# Patient Record
Sex: MALE | URBAN - METROPOLITAN AREA
[De-identification: names, ages, dates, MRNs, and addresses within clinical notes are randomized per-mention and may not be internally consistent; named-entity substitution may affect disease eponyms.]

---

## 2018-10-30 ENCOUNTER — TELEPHONE (OUTPATIENT)
Dept: SPORTS MEDICINE | Facility: CLINIC | Age: 38
End: 2018-10-30

## 2024-01-05 ENCOUNTER — OFFICE VISIT (OUTPATIENT)
Dept: SURGERY | Facility: CLINIC | Age: 44
End: 2024-01-05
Payer: COMMERCIAL

## 2024-01-05 VITALS
HEART RATE: 95 BPM | SYSTOLIC BLOOD PRESSURE: 122 MMHG | OXYGEN SATURATION: 100 % | DIASTOLIC BLOOD PRESSURE: 76 MMHG | WEIGHT: 262.4 LBS | HEIGHT: 73 IN | BODY MASS INDEX: 34.78 KG/M2

## 2024-01-05 DIAGNOSIS — Z86.010 PERSONAL HISTORY OF COLONIC POLYPS: Primary | ICD-10-CM

## 2024-01-05 DIAGNOSIS — K64.4 EXTERNAL HEMORRHOIDS WITH COMPLICATION: ICD-10-CM

## 2024-01-05 DIAGNOSIS — K64.8 INTERNAL HEMORRHOIDS WITH COMPLICATION: ICD-10-CM

## 2024-01-05 PROCEDURE — 46600 DIAGNOSTIC ANOSCOPY SPX: CPT | Performed by: PHYSICIAN ASSISTANT

## 2024-01-05 PROCEDURE — 99204 OFFICE O/P NEW MOD 45 MIN: CPT | Performed by: PHYSICIAN ASSISTANT

## 2024-01-05 RX ORDER — SPIRONOLACTONE 25 MG/1
25 TABLET ORAL DAILY
COMMUNITY

## 2024-01-05 RX ORDER — DAPAGLIFLOZIN AND METFORMIN HYDROCHLORIDE 10; 1000 MG/1; MG/1
1 TABLET, FILM COATED, EXTENDED RELEASE ORAL
COMMUNITY
Start: 2023-12-31

## 2024-01-05 RX ORDER — ASCORBIC ACID 250 MG
250 TABLET ORAL AS NEEDED
COMMUNITY

## 2024-01-05 RX ORDER — SODIUM CHLORIDE 9 MG/ML
30 INJECTION, SOLUTION INTRAVENOUS CONTINUOUS PRN
OUTPATIENT
Start: 2024-01-05

## 2024-01-05 RX ORDER — ROSUVASTATIN CALCIUM 20 MG/1
20 TABLET, COATED ORAL DAILY
COMMUNITY
Start: 2023-12-19

## 2024-01-05 RX ORDER — TADALAFIL 5 MG/1
5 TABLET ORAL AS NEEDED
COMMUNITY
Start: 2023-10-12

## 2024-01-05 RX ORDER — BLOOD-GLUCOSE SENSOR
EACH MISCELLANEOUS
COMMUNITY
Start: 2023-09-11

## 2024-01-05 RX ORDER — BUMETANIDE 2 MG/1
2 TABLET ORAL DAILY
COMMUNITY
Start: 2023-12-11

## 2024-01-05 RX ORDER — UBIDECARENONE 75 MG
100 CAPSULE ORAL DAILY
COMMUNITY

## 2024-01-05 RX ORDER — TESTOSTERONE 20.25 MG/1.25G
2 GEL TOPICAL DAILY
COMMUNITY
Start: 2023-11-13

## 2024-01-05 RX ORDER — SEMAGLUTIDE 2.68 MG/ML
2 INJECTION, SOLUTION SUBCUTANEOUS WEEKLY
COMMUNITY
Start: 2023-12-05

## 2024-01-05 RX ORDER — FLUCONAZOLE 100 MG/1
100 TABLET ORAL AS NEEDED
COMMUNITY
Start: 2014-10-07

## 2024-01-05 NOTE — PROGRESS NOTES
Bernardino Hoyos is a 43 y.o. male who is seen as a consult at the request of Guero Carter MD for hemorrhoids.      HPI:  Has had hemorrhoid for 20 years. Has intermittent flares with pain and bleeding. Bleeding occurs every few months, and pain occurs about once per year. Stable over the years rather than worsening. Thrombosed once in 2009 with incision and thrombectomy. Has BM twice daily. Sublette ranges from 1-7; 4 most often. Some straining with BMs if constipated. Takes stool softeners as needed. Does not take fiber regularly. If hemorrhoid symptoms are more severe, uses preparation h.     Positive family history of colon polyps in mother and brother. Had colonoscopy in about 2010 with Dr. Rich Boyd. Patient believes there was 1 polyp removed.    History reviewed. No pertinent past medical history.    Past Surgical History:   Procedure Laterality Date    ANKLE SURGERY Left 2010    CARDIAC DEFIBRILLATOR PLACEMENT  2016    HERNIA REPAIR  2020    KNEE SURGERY Left        Social History:   reports that he quit smoking about 7 years ago. His smoking use included cigarettes. He has a 7.50 pack-year smoking history. He has been exposed to tobacco smoke. He quit smokeless tobacco use about 7 years ago.  His smokeless tobacco use included snuff. He reports current alcohol use. He reports current drug use. Drug: Marijuana.      Marriage status:     History reviewed. No pertinent family history.      Current Outpatient Medications:     bumetanide (BUMEX) 2 MG tablet, Take 1 tablet by mouth Daily., Disp: , Rfl:     Continuous Blood Gluc Sensor (FreeStyle Snow 3 Sensor) misc, USE EVERY 14 DAYS AND CHECK SUGARS FREQUENTLY AS DIRECTED, Disp: , Rfl:     fluconazole (DIFLUCAN) 100 MG tablet, Take 1 tablet by mouth As Needed., Disp: , Rfl:     glucose blood test strip, Daily., Disp: , Rfl:     Ozempic, 2 MG/DOSE, 8 MG/3ML solution pen-injector, Inject 2 mg under the skin into the appropriate area as directed 1 (One)  Time Per Week., Disp: , Rfl:     rosuvastatin (CRESTOR) 20 MG tablet, Take 1 tablet by mouth Daily., Disp: , Rfl:     tadalafil (CIALIS) 5 MG tablet, Take 1 tablet by mouth As Needed., Disp: , Rfl:     Testosterone 1.62 % gel, Apply 2 g topically to the appropriate area as directed Daily., Disp: , Rfl:     Xigduo XR  MG tablet, 1 tablet., Disp: , Rfl:     ascorbic acid (VITAMIN C) 250 MG tablet, Take 1 tablet by mouth As Needed., Disp: , Rfl:     spironolactone (ALDACTONE) 25 MG tablet, Take 1 tablet by mouth Daily., Disp: , Rfl:     vitamin B-12 (cyanocobalamin) 100 MCG tablet, Take 1 tablet by mouth Daily., Disp: , Rfl:     Allergy  Ndxfveawk-fhclysho-re and Sulfa antibiotics    Vitals:    01/05/24 0958   BP: 122/76   Pulse: 95   SpO2: 100%   -  Body mass index is 34.62 kg/m².    Physical Exam  No acute distress  Chaperone present  Perianal exam: external hemorrhoids mildly enlarged. Small hypertrophied anal papilla in left posterior position. BILL: no masses. Anoscopy performed: Grade 2-3 x 3 irritated internal hemorrhoids    Review of Medical Record:  I reviewed records as noted in HPI.    Assessment:  1. Personal history of colonic polyps    2. External hemorrhoids with complication    3. Internal hemorrhoids with complication      Plan:  Schedule colonoscopy. Will obtain cardiac clearance due to patient's cardiac history and ejection fraction of 10%.  For the hemorrhoids, I advised the patient to start a fiber supplement to a daily total intake of 30 to 40 g for stool consistency.  I provided detailed instructions.    I prescribed Anusol cream for hemorrhoids and gave instructions for use, including that cream is not for daily long-term use.   Follow up after colonoscopy as needed. Call with any questions, concerns, or worsening symptoms.    Lore Márquez PA-C  Physician Assistant  Colorectal Surgery

## 2024-01-29 ENCOUNTER — TELEPHONE (OUTPATIENT)
Dept: SURGERY | Facility: CLINIC | Age: 44
End: 2024-01-29
Payer: COMMERCIAL

## 2024-04-17 ENCOUNTER — ANESTHESIA (OUTPATIENT)
Dept: GASTROENTEROLOGY | Facility: HOSPITAL | Age: 44
End: 2024-04-17
Payer: COMMERCIAL

## 2024-04-17 ENCOUNTER — HOSPITAL ENCOUNTER (OUTPATIENT)
Facility: HOSPITAL | Age: 44
Setting detail: HOSPITAL OUTPATIENT SURGERY
Discharge: HOME | End: 2024-04-17
Attending: COLON & RECTAL SURGERY | Admitting: COLON & RECTAL SURGERY
Payer: COMMERCIAL

## 2024-04-17 ENCOUNTER — ANESTHESIA EVENT (OUTPATIENT)
Dept: GASTROENTEROLOGY | Facility: HOSPITAL | Age: 44
End: 2024-04-17
Payer: COMMERCIAL

## 2024-04-17 VITALS
WEIGHT: 264 LBS | BODY MASS INDEX: 34.83 KG/M2 | RESPIRATION RATE: 16 BRPM | OXYGEN SATURATION: 100 % | DIASTOLIC BLOOD PRESSURE: 68 MMHG | SYSTOLIC BLOOD PRESSURE: 116 MMHG | HEART RATE: 72 BPM

## 2024-04-17 DIAGNOSIS — Z86.010 PERSONAL HISTORY OF COLONIC POLYPS: ICD-10-CM

## 2024-04-17 LAB — GLUCOSE BLDC GLUCOMTR-MCNC: 125 MG/DL (ref 70–130)

## 2024-04-17 PROCEDURE — 45378 DIAGNOSTIC COLONOSCOPY: CPT | Performed by: COLON & RECTAL SURGERY

## 2024-04-17 PROCEDURE — 25010000002 PROPOFOL 10 MG/ML EMULSION: Performed by: NURSE ANESTHETIST, CERTIFIED REGISTERED

## 2024-04-17 PROCEDURE — 82948 REAGENT STRIP/BLOOD GLUCOSE: CPT

## 2024-04-17 PROCEDURE — 25010000002 GLYCOPYRROLATE 0.2 MG/ML SOLUTION: Performed by: NURSE ANESTHETIST, CERTIFIED REGISTERED

## 2024-04-17 PROCEDURE — 25810000003 LACTATED RINGERS PER 1000 ML: Performed by: NURSE ANESTHETIST, CERTIFIED REGISTERED

## 2024-04-17 RX ORDER — SODIUM CHLORIDE, SODIUM LACTATE, POTASSIUM CHLORIDE, CALCIUM CHLORIDE 600; 310; 30; 20 MG/100ML; MG/100ML; MG/100ML; MG/100ML
30 INJECTION, SOLUTION INTRAVENOUS CONTINUOUS PRN
Status: DISCONTINUED | OUTPATIENT
Start: 2024-04-17 | End: 2024-04-17 | Stop reason: HOSPADM

## 2024-04-17 RX ORDER — DROPERIDOL 2.5 MG/ML
0.62 INJECTION, SOLUTION INTRAMUSCULAR; INTRAVENOUS
Status: DISCONTINUED | OUTPATIENT
Start: 2024-04-17 | End: 2024-04-17 | Stop reason: HOSPADM

## 2024-04-17 RX ORDER — PROPOFOL 10 MG/ML
VIAL (ML) INTRAVENOUS CONTINUOUS PRN
Status: DISCONTINUED | OUTPATIENT
Start: 2024-04-17 | End: 2024-04-17 | Stop reason: SURG

## 2024-04-17 RX ORDER — DIPHENHYDRAMINE HYDROCHLORIDE 50 MG/ML
12.5 INJECTION INTRAMUSCULAR; INTRAVENOUS
Status: DISCONTINUED | OUTPATIENT
Start: 2024-04-17 | End: 2024-04-17 | Stop reason: HOSPADM

## 2024-04-17 RX ORDER — ACETAMINOPHEN 500 MG
500 TABLET ORAL EVERY 6 HOURS PRN
COMMUNITY

## 2024-04-17 RX ORDER — SACUBITRIL AND VALSARTAN 49; 51 MG/1; MG/1
1 TABLET, FILM COATED ORAL 2 TIMES DAILY
COMMUNITY

## 2024-04-17 RX ORDER — SODIUM CHLORIDE 9 MG/ML
40 INJECTION, SOLUTION INTRAVENOUS AS NEEDED
Status: DISCONTINUED | OUTPATIENT
Start: 2024-04-17 | End: 2024-04-17 | Stop reason: HOSPADM

## 2024-04-17 RX ORDER — GLYCOPYRROLATE 0.2 MG/ML
INJECTION INTRAMUSCULAR; INTRAVENOUS AS NEEDED
Status: DISCONTINUED | OUTPATIENT
Start: 2024-04-17 | End: 2024-04-17 | Stop reason: SURG

## 2024-04-17 RX ORDER — SODIUM CHLORIDE 0.9 % (FLUSH) 0.9 %
10 SYRINGE (ML) INJECTION EVERY 12 HOURS SCHEDULED
Status: DISCONTINUED | OUTPATIENT
Start: 2024-04-17 | End: 2024-04-17 | Stop reason: HOSPADM

## 2024-04-17 RX ORDER — PROMETHAZINE HYDROCHLORIDE 25 MG/1
25 TABLET ORAL ONCE AS NEEDED
Status: DISCONTINUED | OUTPATIENT
Start: 2024-04-17 | End: 2024-04-17 | Stop reason: HOSPADM

## 2024-04-17 RX ORDER — LIDOCAINE HYDROCHLORIDE 20 MG/ML
INJECTION, SOLUTION INFILTRATION; PERINEURAL AS NEEDED
Status: DISCONTINUED | OUTPATIENT
Start: 2024-04-17 | End: 2024-04-17 | Stop reason: SURG

## 2024-04-17 RX ORDER — SODIUM CHLORIDE 0.9 % (FLUSH) 0.9 %
10 SYRINGE (ML) INJECTION AS NEEDED
Status: DISCONTINUED | OUTPATIENT
Start: 2024-04-17 | End: 2024-04-17 | Stop reason: HOSPADM

## 2024-04-17 RX ORDER — ONDANSETRON 2 MG/ML
4 INJECTION INTRAMUSCULAR; INTRAVENOUS ONCE AS NEEDED
Status: DISCONTINUED | OUTPATIENT
Start: 2024-04-17 | End: 2024-04-17 | Stop reason: HOSPADM

## 2024-04-17 RX ORDER — PROMETHAZINE HYDROCHLORIDE 25 MG/1
25 SUPPOSITORY RECTAL ONCE AS NEEDED
Status: DISCONTINUED | OUTPATIENT
Start: 2024-04-17 | End: 2024-04-17 | Stop reason: HOSPADM

## 2024-04-17 RX ORDER — SODIUM CHLORIDE, SODIUM LACTATE, POTASSIUM CHLORIDE, CALCIUM CHLORIDE 600; 310; 30; 20 MG/100ML; MG/100ML; MG/100ML; MG/100ML
INJECTION, SOLUTION INTRAVENOUS CONTINUOUS PRN
Status: DISCONTINUED | OUTPATIENT
Start: 2024-04-17 | End: 2024-04-17 | Stop reason: SURG

## 2024-04-17 RX ORDER — SODIUM CHLORIDE 9 MG/ML
30 INJECTION, SOLUTION INTRAVENOUS CONTINUOUS PRN
Status: DISCONTINUED | OUTPATIENT
Start: 2024-04-17 | End: 2024-04-17 | Stop reason: HOSPADM

## 2024-04-17 RX ORDER — DIPHENHYDRAMINE HCL 25 MG
50 CAPSULE ORAL NIGHTLY PRN
COMMUNITY

## 2024-04-17 RX ADMIN — SODIUM CHLORIDE, SODIUM LACTATE, POTASSIUM CHLORIDE, AND CALCIUM CHLORIDE: 600; 310; 30; 20 INJECTION, SOLUTION INTRAVENOUS at 10:02

## 2024-04-17 RX ADMIN — LIDOCAINE HYDROCHLORIDE 60 MG: 20 INJECTION, SOLUTION INFILTRATION; PERINEURAL at 10:05

## 2024-04-17 RX ADMIN — GLYCOPYRROLATE 0.1 MG: 0.2 INJECTION INTRAMUSCULAR; INTRAVENOUS at 10:05

## 2024-04-17 RX ADMIN — PROPOFOL 50 MG: 10 INJECTION, EMULSION INTRAVENOUS at 10:08

## 2024-04-17 RX ADMIN — PROPOFOL 70 MG: 10 INJECTION, EMULSION INTRAVENOUS at 10:10

## 2024-04-17 RX ADMIN — PROPOFOL 100 MG: 10 INJECTION, EMULSION INTRAVENOUS at 10:06

## 2024-04-17 RX ADMIN — PROPOFOL 160 MCG/KG/MIN: 10 INJECTION, EMULSION INTRAVENOUS at 10:05

## 2024-04-17 NOTE — ANESTHESIA POSTPROCEDURE EVALUATION
Patient: Bernardino Hoyos    Procedure Summary       Date: 04/17/24 Room / Location:  [facility]    Anesthesia Start: 1002 Anesthesia Stop: 1023    Procedure: COLONOSCOPY to cecum Diagnosis:       Personal history of colonic polyps      (Personal history of colonic polyps [Z86.010])    Surgeons: Tim Enriquez MD Provider: Adam Lovell MD    Anesthesia Type: MAC ASA Status: 4            Anesthesia Type: MAC    Vitals  Vitals Value Taken Time   /68 04/17/24 1043   Temp     Pulse 72 04/17/24 1043   Resp 16 04/17/24 1043   SpO2 100 % 04/17/24 1043           Post Anesthesia Care and Evaluation    Patient location during evaluation: bedside  Patient participation: complete - patient participated  Level of consciousness: awake and alert  Pain management: adequate    Airway patency: patent  Anesthetic complications: No anesthetic complications    Cardiovascular status: acceptable  Respiratory status: acceptable  Hydration status: acceptable    Comments: /68 (BP Location: Left arm, Patient Position: Lying)   Pulse 72   Resp 16   Wt 120 kg (264 lb)   SpO2 100%   BMI 34.83 kg/m²

## 2024-04-17 NOTE — ANESTHESIA PREPROCEDURE EVALUATION
Anesthesia Evaluation     Patient summary reviewed and Nursing notes reviewed   NPO Solid Status: > 8 hours             Airway   Mallampati: II  TM distance: >3 FB  Neck ROM: full  no difficulty expected  Dental - normal exam     Pulmonary - normal exam   (+) a smoker Former,  Cardiovascular - normal exam    (+) pacemaker, hypertension, CHF       Neuro/Psych  GI/Hepatic/Renal/Endo    (+) obesity, diabetes mellitus type 1 using insulin    Musculoskeletal     Abdominal  - normal exam   Substance History      OB/GYN          Other   arthritis,                 Anesthesia Plan    ASA 4     MAC     intravenous induction     Anesthetic plan, risks, benefits, and alternatives have been provided, discussed and informed consent has been obtained with: patient.    CODE STATUS:

## 2024-04-17 NOTE — DISCHARGE INSTRUCTIONS

## 2024-04-17 NOTE — H&P
Bernardino Hooys is a 44 y.o. male  who is referred by Tim Enriquez MD for a colonoscopy. He   has an indications: previous adenomatous polyp.     He denies any change in bowel function, melena, or hematochezia.    Past Medical History:   Diagnosis Date    Arthritis     CHF (congestive heart failure)     Diabetes mellitus     Hypertension        Past Surgical History:   Procedure Laterality Date    ANKLE SURGERY Left 2010    CARDIAC DEFIBRILLATOR PLACEMENT  2016    HERNIA REPAIR  2020    umbilicus    KNEE SURGERY Left     WOUND DEBRIDEMENT Right        Medications Prior to Admission   Medication Sig Dispense Refill Last Dose    acetaminophen (TYLENOL) 500 MG tablet Take 1 tablet by mouth Every 6 (Six) Hours As Needed for Mild Pain.   4/16/2024 at 2100    ascorbic acid (VITAMIN C) 250 MG tablet Take 1 tablet by mouth As Needed.   4/16/2024 at 1500    bumetanide (BUMEX) 2 MG tablet Take 1 tablet by mouth Daily.   4/16/2024 at 1500    diphenhydrAMINE (BENADRYL) 25 mg capsule Take 2 capsules by mouth At Night As Needed for Itching.   4/16/2024 at 2100    insulin glargine (LANTUS, SEMGLEE) 100 UNIT/ML injection Inject 30 Units under the skin into the appropriate area as directed Daily.   4/16/2024 at 1500    Ozempic, 2 MG/DOSE, 8 MG/3ML solution pen-injector Inject 2 mg under the skin into the appropriate area as directed 1 (One) Time Per Week.   4/9/2024 at 1500    sacubitril-valsartan (Entresto) 49-51 MG tablet Take 1 tablet by mouth 2 (Two) Times a Day.   4/16/2024 at 1500    spironolactone (ALDACTONE) 25 MG tablet Take 1 tablet by mouth Daily.   Past Month    tadalafil (CIALIS) 5 MG tablet Take 1 tablet by mouth As Needed.   4/16/2024 at 1500    Testosterone 1.62 % gel Apply 2 g topically to the appropriate area as directed Daily.   Past Month    vitamin B-12 (cyanocobalamin) 100 MCG tablet Take 1 tablet by mouth Daily.   Past Week    Xigduo XR  MG tablet 1 tablet.   4/16/2024 at 1500    Continuous Blood Gluc  Sensor (FreeStyle Snow 3 Sensor) misc USE EVERY 14 DAYS AND CHECK SUGARS FREQUENTLY AS DIRECTED       fluconazole (DIFLUCAN) 100 MG tablet Take 1 tablet by mouth As Needed.   More than a month    glucose blood test strip Daily.       rosuvastatin (CRESTOR) 20 MG tablet Take 1 tablet by mouth Daily.          Allergies   Allergen Reactions    Befaeltpt-Sdudvboj-Kp Palpitations    Sulfa Antibiotics Swelling     Swelling in the mouth and shedding of skin.       Family History   Problem Relation Age of Onset    Malig Hyperthermia Neg Hx        Social History     Socioeconomic History    Marital status:    Tobacco Use    Smoking status: Former     Current packs/day: 0.00     Average packs/day: 0.5 packs/day for 15.0 years (7.5 ttl pk-yrs)     Types: Cigarettes     Start date:      Quit date:      Years since quittin.2     Passive exposure: Past    Smokeless tobacco: Former     Types: Snuff     Quit date:     Tobacco comments:     Uses nicotine pouches used w/o tobacco   Vaping Use    Vaping status: Never Used   Substance and Sexual Activity    Alcohol use: Yes     Comment: COUPLE OF TIMES A YEAR    Drug use: Yes     Frequency: 7.0 times per week     Types: Marijuana     Comment: multiple times daily    Sexual activity: Yes     Partners: Female     Birth control/protection: Vaginal insert contraception       Review of Systems   Gastrointestinal:  Negative for abdominal pain, nausea and vomiting.   All other systems reviewed and are negative.      Vitals:    24 0936   BP: 117/76   Pulse: 82   Resp: 16   SpO2: 96%         Physical Exam  Constitutional:       Appearance: He is well-developed.   HENT:      Head: Normocephalic and atraumatic.   Eyes:      Pupils: Pupils are equal, round, and reactive to light.   Cardiovascular:      Rate and Rhythm: Regular rhythm.   Pulmonary:      Effort: Pulmonary effort is normal.   Abdominal:      General: There is no distension.      Palpations: Abdomen is  soft.   Musculoskeletal:         General: Normal range of motion.   Skin:     General: Skin is warm and dry.   Neurological:      Mental Status: He is alert and oriented to person, place, and time.   Psychiatric:         Thought Content: Thought content normal.         Judgment: Judgment normal.           Assessment & Plan      indications: previous adenomatous polyp         I recommend colonoscopy.  I described risks, benefits of the procedure with the patient including but not limited to bleeding, infection, possibility of perforation and possible polypectomy. All of the patient's questions were answered and they would like to proceed with the above recommendations.

## (undated) DEVICE — KT ORCA ORCAPOD DISP STRL

## (undated) DEVICE — MSK ENDO PORT O2 POM ELITE CURAPLEX A/

## (undated) DEVICE — SENSR O2 OXIMAX FNGR A/ 18IN NONSTR

## (undated) DEVICE — TUBING, SUCTION, 1/4" X 10', STRAIGHT: Brand: MEDLINE

## (undated) DEVICE — ADAPT CLN BIOGUARD AIR/H2O DISP

## (undated) DEVICE — CANN O2 ETCO2 FITS ALL CONN CO2 SMPL A/ 7IN DISP LF

## (undated) DEVICE — LN SMPL CO2 SHTRM SD STREAM W/M LUER